# Patient Record
Sex: FEMALE | Race: WHITE | NOT HISPANIC OR LATINO | URBAN - METROPOLITAN AREA
[De-identification: names, ages, dates, MRNs, and addresses within clinical notes are randomized per-mention and may not be internally consistent; named-entity substitution may affect disease eponyms.]

---
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
## 2024-06-08 ENCOUNTER — EMERGENCY (EMERGENCY)
Facility: HOSPITAL | Age: 19
LOS: 1 days | Discharge: HOME | End: 2024-06-08
Attending: STUDENT IN AN ORGANIZED HEALTH CARE EDUCATION/TRAINING PROGRAM | Admitting: STUDENT IN AN ORGANIZED HEALTH CARE EDUCATION/TRAINING PROGRAM
Payer: COMMERCIAL

## 2024-06-08 VITALS
HEART RATE: 81 BPM | SYSTOLIC BLOOD PRESSURE: 126 MMHG | DIASTOLIC BLOOD PRESSURE: 77 MMHG | WEIGHT: 184.97 LBS | OXYGEN SATURATION: 99 % | TEMPERATURE: 98 F | RESPIRATION RATE: 16 BRPM | HEIGHT: 66 IN

## 2024-06-08 PROCEDURE — 12001 RPR S/N/AX/GEN/TRNK 2.5CM/<: CPT

## 2024-06-08 PROCEDURE — 99284 EMERGENCY DEPT VISIT MOD MDM: CPT | Mod: 25

## 2024-06-08 NOTE — ED ADULT TRIAGE NOTE - NSWEIGHTCALCTOOLDRUG_GEN_A_CORE
Left msg for patient to call back.   has a conflicting appointment and is asking patient to come in at 7:20am instead of 7:40am on June 11th. If not, she can be rescheduled into a 20 minute slot same day or another day that is convenient for her before noon.      used

## 2024-06-09 RX ORDER — TETANUS TOXOID, REDUCED DIPHTHERIA TOXOID AND ACELLULAR PERTUSSIS VACCINE, ADSORBED 5; 2.5; 8; 8; 2.5 [IU]/.5ML; [IU]/.5ML; UG/.5ML; UG/.5ML; UG/.5ML
0.5 SUSPENSION INTRAMUSCULAR ONCE
Refills: 0 | Status: COMPLETED | OUTPATIENT
Start: 2024-06-09 | End: 2024-06-09

## 2024-06-09 RX ADMIN — TETANUS TOXOID, REDUCED DIPHTHERIA TOXOID AND ACELLULAR PERTUSSIS VACCINE, ADSORBED 0.5 MILLILITER(S): 5; 2.5; 8; 8; 2.5 SUSPENSION INTRAMUSCULAR at 00:22

## 2024-06-09 NOTE — ED PROVIDER NOTE - PHYSICAL EXAMINATION
Gen: WDWN, NAD, comfortable appearing, hemodynamically stable    HEENT: No nasal discharge, mucous membranes moist   CV: RRR, +S1/S2, no M/R/G, equal b/l radial pulses 2+  Resp: CTAB, no W/R/R, no increased WOB   GI: Abdomen nondistended  MSK/Skin: 1.5 cm superficial laceration to left thumb distal to DIP with no FB seen, hemostasis with direct pressure, ranging with no difficulty at PIP/DIP   Neuro: A&Ox4, moving all 4 extremities spontaneously, gross sensation intact in UE and LE BL  Psych: appropriate mood

## 2024-06-09 NOTE — ED PROVIDER NOTE - PATIENT PORTAL LINK FT
You can access the FollowMyHealth Patient Portal offered by [facility] by registering at the following website: [email or web address]. By joining [employer]’s FollowMyHealth portal, you will also be able to view your health information using other applications (apps) compatible with our system.

## 2024-06-09 NOTE — ED ADULT NURSE NOTE - TEMPLATE
REVIEW OF SYSTEMS:    GENERAL:  Patient denies fever, chills, tiredness, malaise.  EYES:  Patient denies blurred vision, double vision, pain, burning and itching.   NEUROLOGIC:  Patient denies tremors, dizzy spells, numbness, tingling, vision change, loss of balance.  ENDOCRINE:  Patient denies excessive thirst, heat intolerance, lymphnode enlargement.  GASTROINTESTINAL:  Patient denies abdominal pain, nausea/vomiting, indigestion/heartburn, diarrhea, constipation.  CARDIOVASCUALR:  Patient denies chest pain, varicose veins, high blood pressure.  SKIN:  Patient denies skin rashes, boils, persistent itching, acne.  MUSCULOSKELETAL:  Patient denies joint pain, neck pain, back pain, leg swelling.  ENT/MOUTH:  Patient denies ear infections, sore throats, sinus problems, hearing loss.  RESPIRATORY:  Patient denies wheezing, frequent cough, shortness of breath.   :  Patient denies urine retention, painful urination, urinary frequency, blood in urine, nocturia.  HEME/LYMPHATICE:  Patient denies swollen glands, blood clotting problems.   PSYCHOLOGICAL:  Patient denies anxiety, depression.     General

## 2024-06-09 NOTE — ED PROVIDER NOTE - NSFOLLOWUPINSTRUCTIONS_ED_ALL_ED_FT
Return in 7-10 days to have your stiches removed    A laceration is a cut that goes through all of the layers of the skin and into the tissue that is right under the skin. Some lacerations heal on their own. Others need to be closed with skin adhesive strips, skin glue, stitches (sutures), or staples. Proper laceration care minimizes the risk of infection and helps the laceration to heal better.  If non-absorbable stitches or staples have been placed, they must be taken out within the time frame instructed by your healthcare provider.    SEEK IMMEDIATE MEDICAL CARE IF YOU HAVE ANY OF THE FOLLOWING SYMPTOMS: swelling around the wound, worsening pain, drainage from the wound, red streaking going away from your wound, inability to move finger or toe near the laceration, or discoloration of skin near the laceration.

## 2024-06-09 NOTE — ED ADULT NURSE NOTE - OBJECTIVE STATEMENT
Pt arrives to ED, A&Ox4, ambulatory at baseline. Pt denies PMHx. Pt C/O laceration to left thumb S/P cutting an apple with a knife. Pt is not actively bleeding. As per MD Nunez, pt told to apply pressure to laceration with gauze, until sutures can be placed. Respirations are even and unlabored, abdomen is soft and nondistended, capillary refill is 2 seconds bilaterally, skin is otherwise clean, dry, and intact. Pt received tetanus shot to right deltoid, tolerated well. No further nursing interventions needed at this time. Bed in lowest position, comfort measures provided, safety maintained. Pending suture placement.

## 2024-06-09 NOTE — ED PROVIDER NOTE - CLINICAL SUMMARY MEDICAL DECISION MAKING FREE TEXT BOX
18-year-old female with no past medical history presenting with left thumb laceration from knife while slicing an apple, Superficial, hemostasis achieved with direct pressure, distal to DIP and ranging with no difficulty, neurovascularly intact. Will irrigate wound, perform laceration repair with sutures, update tetanus, DC with return precautions and follow-up

## 2024-06-09 NOTE — ED PROVIDER NOTE - OBJECTIVE STATEMENT
18-year-old female with no past medical history presenting with left thumb laceration from knife while slicing an apple. Denies any large amount of bleeding, blood pooling, LOC, chest pain, shortness of breath, weakness, numbness/tingling.  Denies any other injury.  Able to range finger with no difficulty and stop bleeding with direct pressure.  Denies any AC use.

## 2024-10-13 NOTE — ED ADULT TRIAGE NOTE - CHIEF COMPLAINT QUOTE
[facility] Emergency Department Note  Patient: Alysa Espino Age: 20 year old Sex: female      MRN: [id number]  : 2004    Patient Seen in: [facility] Emergency Department    History     Chief Complaint   Patient presents with    Allergic Rhinitis     Stated Complaint: URI    History obtained from: patient     20F hx of prior UTI presents to ED with congestion, sneezing, runny nose, sore throat x2 days. Denies fever or chills. Denies chest pain nor SOB. Denies coughing. No abdominal pain, n/v/d, dysuria, hematuria or frequency. No recent travel or known sick contacts. Not sure if it could be allergies, denies known triggers, did not take meds PTA.     Review of Systems:  Review of Systems  Positive for stated complaint: URI. Constitutional and vital signs reviewed. All other systems reviewed and negative except as noted above.    Patient History:  Past Medical History:    Pyelonephritis    Recurrent UTI    Scoliosis       History reviewed. No pertinent surgical history.     No family history on file.    Specific Social Determinants of Health:   Social History     Socioeconomic History    Marital status: Single   Tobacco Use    Smoking status: Never    Smokeless tobacco: Never   Vaping Use    Vaping status: Never Used   Substance and Sexual Activity    Alcohol use: Never    Drug use: Yes     Types: Cannabis     Social Drivers of Health     Food Insecurity: No Food Insecurity (2024)    Food Insecurity     Food Insecurity: Never true   Transportation Needs: No Transportation Needs (2024)    Transportation Needs     Lack of Transportation: No   Housing Stability: Low Risk  (2024)    Housing Stability     Housing Instability: No           PSFH elements reviewed from today and agreed except as otherwise stated in HPI.    Physical Exam     ED Triage Vitals [10/13/24 0134]   /73   Pulse 89   Resp 18   Temp 98.1 °F (36.7 °C)   Temp src Oral   SpO2 99 %   O2 Device None (Room air)        Current:/73   Pulse 89   Temp 98.1 °F (36.7 °C) (Oral)   Resp 18   Ht 162.6 cm (5' 4\")   Wt 70.3 kg   LMP 09/20/2024 (Exact Date)   SpO2 99%   BMI 26.61 kg/m²         Physical Exam  Vitals and nursing note reviewed.   Constitutional:       General: She is not in acute distress.     Appearance: She is not ill-appearing.   HENT:      Head: Normocephalic and atraumatic.      Right Ear: Tympanic membrane, ear canal and external ear normal.      Left Ear: Tympanic membrane, ear canal and external ear normal.      Nose: Congestion present. No rhinorrhea.      Comments: Swollen nasal turbinates bilaterally. No epistaxis      Mouth/Throat:      Mouth: Mucous membranes are moist.      Pharynx: Oropharynx is clear.   Eyes:      Extraocular Movements: Extraocular movements intact.      Conjunctiva/sclera: Conjunctivae normal.      Pupils: Pupils are equal, round, and reactive to light.   Neck:      Comments: Subcentimeter R anterior cervical lymphadenopathy. No thyromegaly.   Cardiovascular:      Rate and Rhythm: Normal rate and regular rhythm.      Heart sounds: No murmur heard.  Pulmonary:      Effort: Pulmonary effort is normal. No respiratory distress.      Breath sounds: No wheezing, rhonchi or rales.   Abdominal:      General: There is no distension.      Palpations: Abdomen is soft.      Tenderness: There is no abdominal tenderness. There is no guarding or rebound.   Musculoskeletal:         General: No deformity.      Cervical back: Normal range of motion and neck supple. No rigidity or tenderness.   Lymphadenopathy:      Cervical: Cervical adenopathy present.   Skin:     General: Skin is warm and dry.      Capillary Refill: Capillary refill takes less than 2 seconds.   Neurological:      General: No focal deficit present.      Mental Status: She is alert and oriented to person, place, and time.         ED Course   Labs:   Labs Reviewed   POCT PREGNANCY URINE - Normal   POCT RAPID STREP - Normal    SARS-COV-2/FLU A AND B/RSV BY PCR (GENEXPERT) - Normal    Narrative:     This test is intended for the qualitative detection and differentiation of SARS-CoV-2, influenza A, influenza B, and respiratory syncytial virus (RSV) viral RNA in nasopharyngeal or nares swabs from individuals suspected of respiratory viral infection consistent with COVID-19 by their healthcare provider. Signs and symptoms of respiratory viral infection due to SARS-CoV-2, influenza, and RSV can be similar.    Test performed using the Xpert Xpress SARS-CoV-2/FLU/RSV (real time RT-PCR)  assay on the GeneXpert instrument, [employer], [place] [address].   This test is being used under the Food and Drug Administration's Emergency Use Authorization.    The authorized Fact Sheet for Healthcare Providers for this assay is available upon request from the laboratory.       MDM   This patient presents with c/o sneezing, nasal congestion, runny nose, slight sore throat x2 days without cough. Pt well appearing with swollen turbinates on exam however otherwise essentially unremarkable exam, she is well appearing afebrile VSS.     Differential diagnoses considered includes, but is not limited to:   Viral syndrome  Allergic rhinitis  Strep throat less likely but considered given lack of cough    Will obtain the following tests: upreg, viral swab, POCT urine   Will administer the following medications/therapies: n/a     Please see ED course for my independent review of these tests/imaging results.    Chronic conditions affecting care: n/a       ED Course as of 10/13/24 0825  ------------------------------------------------------------  Time: 10/13 0309  Value: POCT urine pregnancy: Negative  Comment: (Reviewed)  ------------------------------------------------------------  Time: 10/13 0317  Comment: Strep neg. Pt stable for DC at this time. Rx for zyrtec PRN, benadryl PRN, Afrin and motrin to pharmacy. Counseled on appropriate use of Afrin. Counseled to  return if new/worsening sx or change in condition, otherwise f/u with PMD in 2-3 days. She is comfortable with DC plan.             Procedures:  Procedures      Disposition and Plan     Clinical Impression:  1. Nasal congestion    2. Sneezing    3. Sore throat        Disposition:  Discharge    Follow-up:  [facility] Emergency Department  [address]  [facility] [address]  [phone]  Go to  If symptoms worsen, immediately    Gretchen Brand, APRN  [address] [facility]  [address]  [facility] [address]  [phone]    Schedule an appointment as soon as possible for a visit in 2 day(s)        Medications Prescribed:  Discharge Medication List as of 10/13/2024  3:26 AM        START taking these medications    Details   cetirizine 10 MG Oral Tab Take 1 tablet (10 mg total) by mouth daily as needed for Rhinitis or Allergies., Normal, Disp-30 tablet, R-0      oxymetazoline 0.05 % Nasal Solution 1 spray by Nasal route every 4 (four) hours as needed for congestion. DO NOT TAKE FOR MORE THAN 2 DAYS. This medicine can be habit forming., Normal, Disp-15 mL, R-0      diphenhydrAMINE 25 MG Oral Cap Take 1 capsule (25 mg total) by mouth every 6 (six) hours as needed for Itching or Allergies., Normal, Disp-30 capsule, R-0      fluticasone propionate 50 MCG/ACT Nasal Suspension 2 sprays by Nasal route daily for 7 days., Normal, Disp-9.9 mL, R-0      ibuprofen 600 MG Oral Tab Take 1 tablet (600 mg total) by mouth every 6 (six) hours as needed., Normal, Disp-28 tablet, R-0               This note may have been created using voice dictation technology and may include inadvertent errors.      Karli Cohen,   Attending Physician   Emergency Medicine          Pt c/o left thumb laceration from knife while slicing an apple. Denies hx